# Patient Record
(demographics unavailable — no encounter records)

---

## 2017-05-08 NOTE — ED
Abdominal Pain HPI





- General


Chief Complaint: Abdominal Pain


Stated Complaint: Abd Pain


Time Seen by Provider: 05/08/17 23:11


Source: patient, RN notes reviewed


Mode of arrival: ambulatory


Limitations: no limitations





- History of Present Illness


Initial Comments: 





Patient is a 27-year-old female presents to the emergency room for evaluation 

of abdominal pain.  Patient states she has been experiencing a cramping-type 

pain in her lower abdomen for the past 2 weeks.  Patient states she's never had 

pain like this before.  Patient denies any history of abdominal surgeries.  

Patient does mention she has a history of PCOS.  Patient states this pain does 

not feel similar to her normal PCOS symptoms.  Patient states her last 

menstrual cycle ended on 5/5/17.  Patient states her menstrual cycle lasted 

about 3 days.  Patient states she recently had her IUD taken out about a month 

ago.  Patient no longer states she is on birth control.  Patient denies chest 

pain or shortness of breath.  Patient denies nausea or vomiting.  Patient 

denies headache or dizziness.  Patient denies pain or burning during urination, 

trouble urinating or blood in urine.  Patient denies constipation or diarrhea.  

Patient states she had two normal bowel movements today.  Patient denies fevers 

or chills.





- Related Data


 Previous Rx's











 Medication  Instructions  Recorded


 


Dicyclomine [Bentyl] 10 mg PO TID PRN #12 capsule 05/09/17











 Allergies











Allergy/AdvReac Type Severity Reaction Status Date / Time


 


No Known Allergies Allergy   Verified 05/08/17 23:27














Review of Systems


ROS Statement: 


Those systems with pertinent positive or pertinent negative responses have been 

documented in the HPI.





ROS Other: All systems not noted in ROS Statement are negative.





Past Medical History


Past Medical History: No Reported History


History of Any Multi-Drug Resistant Organisms: None Reported


Past Surgical History: No Surgical Hx Reported


Past Psychological History: No Psychological Hx Reported


Smoking Status: Current some day smoker


Past Alcohol Use History: Occasional


Past Drug Use History: None Reported





General Exam





- General Exam Comments


Initial Comments: 





Sitting in exam room, no acute distress.


Limitations: no limitations


General appearance: alert, in no apparent distress


Head exam: Present: atraumatic, normocephalic, normal inspection


Eye exam: Present: normal appearance


ENT exam: Present: normal exam


Neck exam: Present: normal inspection


Respiratory exam: Present: normal lung sounds bilaterally.  Absent: respiratory 

distress


Cardiovascular Exam: Present: regular rate, normal rhythm, normal heart sounds


GI/Abdominal exam: Present: soft, normal bowel sounds.  Absent: distended, 

tenderness, guarding, rebound, rigid


Extremities exam: Present: normal inspection


Back exam: Present: normal inspection


Neurological exam: Present: alert, oriented X3, CN II-XII intact, normal gait


Psychiatric exam: Present: normal affect, normal mood


Skin exam: Present: warm, dry, intact, normal color.  Absent: rash





Course


 Vital Signs











  05/08/17 05/09/17





  23:05 01:35


 


Temperature 98.4 F 97.2 F L


 


Pulse Rate 60 75


 


Respiratory 18 18





Rate  


 


Blood Pressure 106/70 101/70


 


O2 Sat by Pulse 98 98





Oximetry  














Medical Decision Making





- Medical Decision Making





Patient is a 27-year-old female presents emergency room for evaluation of lower 

abdominal cramping.  Labs show no concerning findings.  Ultrasound shows no 

significant findings.  Patient will be placed on Bentyl for cramping and 

advised to follow-up with primary care provider for further evaluation.  

Patient states she understands everything that was discussed with her.  Return 

parameters discussed.  Case discussed with Dr. Hanson.





- Lab Data


Result diagrams: 


 05/08/17 23:43





 05/08/17 23:43


 Lab Results











  05/08/17 05/08/17 05/08/17 Range/Units





  23:15 23:15 23:43 


 


WBC     (3.8-10.6)  k/uL


 


RBC     (3.80-5.40)  m/uL


 


Hgb     (11.4-16.0)  gm/dL


 


Hct     (34.0-46.0)  %


 


MCV     (80.0-100.0)  fL


 


MCH     (25.0-35.0)  pg


 


MCHC     (31.0-37.0)  g/dL


 


RDW     (11.5-15.5)  %


 


Plt Count     (150-450)  k/uL


 


Neutrophils %     %


 


Lymphocytes %     %


 


Monocytes %     %


 


Eosinophils %     %


 


Basophils %     %


 


Neutrophils #     (1.3-7.7)  k/uL


 


Lymphocytes #     (1.0-4.8)  k/uL


 


Monocytes #     (0-1.0)  k/uL


 


Eosinophils #     (0-0.7)  k/uL


 


Basophils #     (0-0.2)  k/uL


 


Sodium    141  (137-145)  mmol/L


 


Potassium    3.9  (3.5-5.1)  mmol/L


 


Chloride    105  ()  mmol/L


 


Carbon Dioxide    24  (22-30)  mmol/L


 


Anion Gap    12  mmol/L


 


BUN    14  (7-17)  mg/dL


 


Creatinine    0.70  (0.52-1.04)  mg/dL


 


Est GFR (MDRD) Af Amer    >60  (>60 ml/min/1.73 sqM)  


 


Est GFR (MDRD) Non-Af    >60  (>60 ml/min/1.73 sqM)  


 


Glucose    84  (74-99)  mg/dL


 


Calcium    9.7  (8.4-10.2)  mg/dL


 


Total Bilirubin    0.4  (0.2-1.3)  mg/dL


 


AST    27  (14-36)  U/L


 


ALT    35  (9-52)  U/L


 


Alkaline Phosphatase    53  ()  U/L


 


Total Protein    7.3  (6.3-8.2)  g/dL


 


Albumin    4.4  (3.5-5.0)  g/dL


 


Amylase    49  ()  U/L


 


Lipase    169  ()  U/L


 


Urine Color   Light Yellow   


 


Urine Appearance   Clear   (Clear)  


 


Urine pH   5.5   (5.0-8.0)  


 


Ur Specific Gravity   1.006   (1.001-1.035)  


 


Urine Protein   Negative   (Negative)  


 


Urine Glucose (UA)   Negative   (Negative)  


 


Urine Ketones   Negative   (Negative)  


 


Urine Blood   Negative   (Negative)  


 


Urine Nitrite   Negative   (Negative)  


 


Urine Bilirubin   Negative   (Negative)  


 


Urine Urobilinogen   <2.0   (<2.0)  mg/dL


 


Ur Leukocyte Esterase   Trace H   (Negative)  


 


Urine RBC   <1   (0-5)  /hpf


 


Urine WBC   2   (0-5)  /hpf


 


Ur Squamous Epith Cells   <1   (0-4)  /hpf


 


Urine HCG, Qual  Not Detected    (Not Detectd)  














  05/08/17 Range/Units





  23:43 


 


WBC  8.1  (3.8-10.6)  k/uL


 


RBC  4.23  (3.80-5.40)  m/uL


 


Hgb  13.1  (11.4-16.0)  gm/dL


 


Hct  38.3  (34.0-46.0)  %


 


MCV  90.7  (80.0-100.0)  fL


 


MCH  30.9  (25.0-35.0)  pg


 


MCHC  34.0  (31.0-37.0)  g/dL


 


RDW  12.5  (11.5-15.5)  %


 


Plt Count  301  (150-450)  k/uL


 


Neutrophils %  48  %


 


Lymphocytes %  43  %


 


Monocytes %  4  %


 


Eosinophils %  2  %


 


Basophils %  1  %


 


Neutrophils #  3.9  (1.3-7.7)  k/uL


 


Lymphocytes #  3.5  (1.0-4.8)  k/uL


 


Monocytes #  0.3  (0-1.0)  k/uL


 


Eosinophils #  0.2  (0-0.7)  k/uL


 


Basophils #  0.1  (0-0.2)  k/uL


 


Sodium   (137-145)  mmol/L


 


Potassium   (3.5-5.1)  mmol/L


 


Chloride   ()  mmol/L


 


Carbon Dioxide   (22-30)  mmol/L


 


Anion Gap   mmol/L


 


BUN   (7-17)  mg/dL


 


Creatinine   (0.52-1.04)  mg/dL


 


Est GFR (MDRD) Af Amer   (>60 ml/min/1.73 sqM)  


 


Est GFR (MDRD) Non-Af   (>60 ml/min/1.73 sqM)  


 


Glucose   (74-99)  mg/dL


 


Calcium   (8.4-10.2)  mg/dL


 


Total Bilirubin   (0.2-1.3)  mg/dL


 


AST   (14-36)  U/L


 


ALT   (9-52)  U/L


 


Alkaline Phosphatase   ()  U/L


 


Total Protein   (6.3-8.2)  g/dL


 


Albumin   (3.5-5.0)  g/dL


 


Amylase   ()  U/L


 


Lipase   ()  U/L


 


Urine Color   


 


Urine Appearance   (Clear)  


 


Urine pH   (5.0-8.0)  


 


Ur Specific Gravity   (1.001-1.035)  


 


Urine Protein   (Negative)  


 


Urine Glucose (UA)   (Negative)  


 


Urine Ketones   (Negative)  


 


Urine Blood   (Negative)  


 


Urine Nitrite   (Negative)  


 


Urine Bilirubin   (Negative)  


 


Urine Urobilinogen   (<2.0)  mg/dL


 


Ur Leukocyte Esterase   (Negative)  


 


Urine RBC   (0-5)  /hpf


 


Urine WBC   (0-5)  /hpf


 


Ur Squamous Epith Cells   (0-4)  /hpf


 


Urine HCG, Qual   (Not Detectd)  














- Radiology Data


Radiology results: report reviewed, image reviewed





Disposition


Clinical Impression: 


 Abdominal pain





Disposition: HOME SELF-CARE


Condition: Good


Instructions:  Abdominal Pain (ED)


Additional Instructions: 


Take medications as needed.  Please follow up with primary care provider for 

further evaluation. If any new symptom arises or symptoms worsen, return to ER 

as soon as possible.  


Prescriptions: 


Dicyclomine [Bentyl] 10 mg PO TID PRN #12 capsule


 PRN Reason: Pain


Referrals: 


She Bernard MD [Primary Care Provider] - 1-2 days


Time of Disposition: 01:17

## 2017-05-09 NOTE — US
EXAM:

  US Pelvis Complete, transvaginal

 

CLINICAL HISTORY:

  Reason: Pain

 

TECHNIQUE:

  Real-time transvaginal pelvic ultrasound (complete) with image 

documentation.

 

COMPARISON:

  No relevant prior studies available.

 

FINDINGS:

  Uterus/cervix:  Uterus measures 7.8 x 3.6 x 5.5 cm.  No myometrial 

masses identified.  Endometrial echo complex measures 7.0 mm.  No 

endometrial fluid collections identified.

  Right ovary:  Right ovary is unremarkable measuring 2.9 x 2.0 x 1.9 cm. 

and contains several small follicles.  Both ovaries demonstrate normal 

Doppler vascular flow signal.  

  Left ovary:  Left ovary is unremarkable measuring 3.1 x 2.2 x 2.3 cm 

and contains several small follicles.

  Free fluid:  No free fluid in the cul-de-sac.

 

  Other findings:  No abnormal  adnexal Masses or cysts identified.

 

IMPRESSION:     

  Normal pelvic ultrasound.

## 2018-08-29 NOTE — ED
ENT HPI





- General


Chief complaint: ENT


Stated complaint: Ear Pain


Time Seen by Provider: 08/29/18 22:23


Source: patient


Mode of arrival: ambulatory


Limitations: no limitations





- History of Present Illness


Initial comments: 


This a 28-year-old female past medical history of chronic sinus issues and 

current left tympanic membrane perforation who presents today for chief 

complaint of right ear pain.  Patient states that she has been seeing an ears 

nose throat specialist for a left tympanic membranes perforation and chronic 

sinusitis.  However about 4 hours ago she began noticing right ear pain.  

Patient denies fever, chills, pain to the posterior, night sweats, complete 

hearing loss, drainage, injury to the rigth ear, tinnitus or any other 

symptoms.  Patient denies any rashes or lesions. Pt denies recent antibiotic 

use. Remainder ROS (-). Upon arrival pt VS stable.








- Related Data


 Home Medications











 Medication  Instructions  Recorded  Confirmed


 


Cetirizine HCl [Zyrtec] 10 mg PO DAILY 08/29/18 08/29/18


 


Fluticasone Nasal [place] [Flonase 1 spray NASAL AS DIRECTED PRN 08/29/18 08/29/18





Nasal Spray]   








 Previous Rx's











 Medication  Instructions  Recorded


 


Amoxicillin 500 mg PO Q12HR 10 Days #20 cap 08/29/18


 


Ibuprofen [Motrin] 600 mg PO Q8HR PRN 7 Days #21 tab 08/29/18











 Allergies











Allergy/AdvReac Type Severity Reaction Status Date / Time


 


No Known Allergies Allergy   Verified 08/29/18 22:11














Review of Systems


ROS Statement: 


Those systems with pertinent positive or pertinent negative responses have been 

documented in the HPI.





ROS Other: All systems not noted in ROS Statement are negative.


Constitutional: Denies: fever, chills, night sweats


ENT: Reports: ear pain, hearing loss (pt admits to muffled hearing in right hear

)


Respiratory: Denies: dyspnea, wheezes, hemoptysis, stridor


Cardiovascular: Denies: chest pain, palpitations


Endocrine: Denies: fatigue


Gastrointestinal: Denies: abdominal pain, nausea, vomiting, diarrhea, 

constipation


Genitourinary: Denies: urgency, dysuria


Musculoskeletal: Denies: back pain


Skin: Denies: as per HPI, rash, lesions


Neurological: Denies: headache, numbness, paresthesias, confusion, abnormal gait





Past Medical History


Past Medical History: No Reported History


History of Any Multi-Drug Resistant Organisms: None Reported


Past Surgical History: No Surgical Hx Reported


Past Psychological History: No Psychological Hx Reported


Smoking Status: Former smoker


Past Alcohol Use History: Occasional


Past Drug Use History: None Reported





General Exam





- General Exam Comments


Initial Comments: 


General:  The patient is awake and alert, in no distress, and does not appear 

acutely ill. 


Eye:  Pupils are equal, round and reactive to light, extra-ocular movements are 

intact.  No nystagmus.  There is normal conjunctiva bilaterally.  No signs of 

icterus.  


Ears, nose, mouth and throat:  There are moist mucous membranes and no oral 

lesions. Left tympanic membranes not erythematous, however there is a tympanic 

number perforation at the 6 o'clock position.  There is no evidence of active 

draining.  Upon examination of the right big membrane that is erythematous and 

bulging.  There is no evidence of effusion or tympanic membrane perforation at 

this time.  Examination of external auditory canal the ears bilaterally is 

within normal limits no erythema or edema.  Patient denies pain to pulling of 

the auricle or pressure on the tragus.  There is no tenderness to patient over 

the mastoid bilaterally.  No palpable preauricular lymph adenopathy.  

Examination of the oropharynx there is postnasal drip.  Tonsils are not 

erythematous, not erythematous oropharynx.  Tonsillar exudates or crypts.  


Neck:  The neck is supple, there is no tenderness or JVD.  


Cardiovascular:  There is a regular rate and rhythm. No murmur, rub or gallop 

is appreciated.


Respiratory:  Lungs are clear to auscultation, respirations are non-labored, 

breath sounds are equal.  No wheezes, stridor, rales, or rhonchi.


Musculoskeletal:  Normal ROM, no tenderness.  Radial pulses equal bilaterally 2+

.  


Neurological:  A&O x 3. CN II-XII intact, There are no obvious motor or sensory 

deficits. Coordination appears grossly intact. Speech is normal.


Skin:  Skin is warm and dry and no rashes or lesions are noted. 


Psychiatric:  Cooperative, appropriate mood & affect, normal judgment.  





Limitations: no limitations





Course


 Vital Signs











  08/29/18





  22:09


 


Temperature 98.4 F


 


Pulse Rate 66


 


Respiratory 20





Rate 


 


Blood Pressure 121/85


 


O2 Sat by Pulse 99





Oximetry 














Medical Decision Making





- Medical Decision Making


PE revealed signs of right AOM. No concern/signs/symptoms of mastoiditis or 

cholesteatoma at this time. Pt VS stable afebrile. At this time I feel pt is 

stable for d/c with ENT f/u and RX for amoxicillin (pt denied current or recent 

abx use). Case discussed wiht Dr. Lemos and pt discharged in stable 

condition. Pt agreed with plan prior to discharge and denied questions at this 

time. Pt denied current pregnancy or risk of pregnancy at this time.





Disposition


Clinical Impression: 


 Otitis media, Right ear pain





Disposition: HOME SELF-CARE


Condition: Good


Instructions:  Ear Infection (ED)


Additional Instructions: 


Please use medication as discussed.  Please follow-up with your ENT or family 

doctor in the next 2-3 days.  Please return to emergency room if the symptoms 

increase or worsen or for any other concerns.


Prescriptions: 


Amoxicillin 500 mg PO Q12HR 10 Days #20 cap


Ibuprofen [Motrin] 600 mg PO Q8HR PRN 7 Days #21 tab


 PRN Reason: Pain


Is patient prescribed a controlled substance at d/c from ED?: No


Referrals: 


Edu Ybarra DO [Primary Care Provider] - 1-2 days


Yo Cedeño DO [Doctor of Osteopathic Medicine] - 1-2 days


Time of Disposition: 22:44